# Patient Record
Sex: FEMALE | Race: BLACK OR AFRICAN AMERICAN | NOT HISPANIC OR LATINO | Employment: FULL TIME | URBAN - METROPOLITAN AREA
[De-identification: names, ages, dates, MRNs, and addresses within clinical notes are randomized per-mention and may not be internally consistent; named-entity substitution may affect disease eponyms.]

---

## 2019-12-12 PROBLEM — C50.012 MALIGNANT NEOPLASM INVOLVING BOTH NIPPLE AND AREOLA OF LEFT BREAST IN FEMALE, ESTROGEN RECEPTOR POSITIVE: Status: ACTIVE | Noted: 2019-12-12

## 2019-12-12 PROBLEM — Z17.0 MALIGNANT NEOPLASM INVOLVING BOTH NIPPLE AND AREOLA OF LEFT BREAST IN FEMALE, ESTROGEN RECEPTOR POSITIVE: Status: ACTIVE | Noted: 2019-12-12

## 2021-01-12 PROBLEM — Z79.810 CARE RELATED TO CURRENT TAMOXIFEN USE: Status: ACTIVE | Noted: 2021-01-12

## 2021-01-12 PROBLEM — D56.9 THALASSEMIA: Status: ACTIVE | Noted: 2021-01-12

## 2021-01-12 PROBLEM — B37.9 CANDIDA ALBICANS INFECTION: Status: ACTIVE | Noted: 2021-01-12

## 2021-01-12 PROBLEM — D50.9 IRON DEFICIENCY ANEMIA: Status: ACTIVE | Noted: 2021-01-12

## 2021-05-12 ENCOUNTER — PATIENT MESSAGE (OUTPATIENT)
Dept: RESEARCH | Facility: HOSPITAL | Age: 51
End: 2021-05-12

## 2021-08-04 PROBLEM — Z76.0 MEDICATION REFILL: Status: ACTIVE | Noted: 2021-08-04

## 2021-08-04 PROBLEM — Z85.3 ENCOUNTER FOR FOLLOW-UP SURVEILLANCE OF BREAST CANCER: Status: ACTIVE | Noted: 2021-08-04

## 2021-08-04 PROBLEM — Z08 ENCOUNTER FOR FOLLOW-UP SURVEILLANCE OF BREAST CANCER: Status: ACTIVE | Noted: 2021-08-04

## 2021-11-02 PROBLEM — R42 DIZZINESS: Status: ACTIVE | Noted: 2021-11-02

## 2021-11-08 PROBLEM — Z85.3 ENCOUNTER FOR FOLLOW-UP SURVEILLANCE OF BREAST CANCER: Status: RESOLVED | Noted: 2021-08-04 | Resolved: 2021-11-08

## 2021-11-08 PROBLEM — Z08 ENCOUNTER FOR FOLLOW-UP SURVEILLANCE OF BREAST CANCER: Status: RESOLVED | Noted: 2021-08-04 | Resolved: 2021-11-08

## 2022-01-14 PROBLEM — J30.9 ALLERGIC SINUSITIS: Status: ACTIVE | Noted: 2022-01-14

## 2023-04-21 PROBLEM — J31.0 CHRONIC RHINITIS: Status: ACTIVE | Noted: 2023-04-21

## 2023-04-21 PROBLEM — J32.9 RECURRENT SINUS INFECTIONS: Status: ACTIVE | Noted: 2023-04-21

## 2023-04-21 PROBLEM — J30.9 ALLERGIC SINUSITIS: Status: RESOLVED | Noted: 2022-01-14 | Resolved: 2023-04-21

## 2023-04-21 PROBLEM — Z76.0 MEDICATION REFILL: Status: RESOLVED | Noted: 2021-08-04 | Resolved: 2023-04-21

## 2023-07-26 PROBLEM — M25.569 KNEE PAIN: Status: ACTIVE | Noted: 2023-07-26

## 2023-07-26 PROBLEM — J32.9 RECURRENT SINUS INFECTIONS: Status: RESOLVED | Noted: 2023-04-21 | Resolved: 2023-07-26

## 2023-07-26 PROBLEM — R42 DIZZINESS: Status: RESOLVED | Noted: 2021-11-02 | Resolved: 2023-07-26

## 2023-07-26 PROBLEM — B37.9 CANDIDA ALBICANS INFECTION: Status: RESOLVED | Noted: 2021-01-12 | Resolved: 2023-07-26

## 2023-11-08 PROBLEM — E55.9 VITAMIN D DEFICIENCY: Status: ACTIVE | Noted: 2023-11-08

## 2023-11-08 PROBLEM — M25.561 BILATERAL KNEE PAIN: Status: ACTIVE | Noted: 2023-07-26

## 2023-11-08 PROBLEM — M25.562 BILATERAL KNEE PAIN: Status: ACTIVE | Noted: 2023-07-26

## 2024-06-04 ENCOUNTER — PATIENT OUTREACH (OUTPATIENT)
Dept: ADMINISTRATIVE | Facility: HOSPITAL | Age: 54
End: 2024-06-04

## 2025-06-25 ENCOUNTER — PATIENT OUTREACH (OUTPATIENT)
Dept: ADMINISTRATIVE | Facility: HOSPITAL | Age: 55
End: 2025-06-25

## 2025-06-25 NOTE — PROGRESS NOTES
6-25-25 please address open care gap cervical cancer screening, please offer self swab during visit, noted on 6-26-25 appt notes